# Patient Record
Sex: FEMALE | Race: WHITE | Employment: OTHER | URBAN - METROPOLITAN AREA
[De-identification: names, ages, dates, MRNs, and addresses within clinical notes are randomized per-mention and may not be internally consistent; named-entity substitution may affect disease eponyms.]

---

## 2022-10-15 ENCOUNTER — HOSPITAL ENCOUNTER (EMERGENCY)
Dept: ULTRASOUND IMAGING | Age: 37
Discharge: HOME | End: 2022-10-18

## 2022-10-15 ENCOUNTER — HOSPITAL ENCOUNTER (EMERGENCY)
Age: 37
Discharge: HOME | End: 2022-10-15
Attending: EMERGENCY MEDICINE | Admitting: EMERGENCY MEDICINE

## 2022-10-15 VITALS
WEIGHT: 166 LBS | DIASTOLIC BLOOD PRESSURE: 73 MMHG | BODY MASS INDEX: 27.66 KG/M2 | RESPIRATION RATE: 16 BRPM | SYSTOLIC BLOOD PRESSURE: 103 MMHG | HEIGHT: 65 IN | TEMPERATURE: 98.5 F | HEART RATE: 91 BPM | OXYGEN SATURATION: 93 %

## 2022-10-15 DIAGNOSIS — I83.819 PAIN DUE TO VARICOSE VEINS OF LOWER EXTREMITY: ICD-10-CM

## 2022-10-15 DIAGNOSIS — M79.604 RIGHT LEG PAIN: Primary | ICD-10-CM

## 2022-10-15 PROCEDURE — 99284 EMERGENCY DEPT VISIT MOD MDM: CPT | Performed by: EMERGENCY MEDICINE

## 2022-10-15 PROCEDURE — 93971 EXTREMITY STUDY: CPT

## 2022-10-15 RX ORDER — ASPIRIN 81 MG/1
81 TABLET ORAL DAILY
COMMUNITY

## 2022-10-15 ASSESSMENT — PAIN - FUNCTIONAL ASSESSMENT
PAIN_FUNCTIONAL_ASSESSMENT: 0-10
PAIN_FUNCTIONAL_ASSESSMENT: 0-10

## 2022-10-15 ASSESSMENT — PAIN SCALES - GENERAL
PAINLEVEL_OUTOF10: 3
PAINLEVEL_OUTOF10: 3

## 2022-10-15 ASSESSMENT — ENCOUNTER SYMPTOMS
COLOR CHANGE: 0
ABDOMINAL PAIN: 0
DIARRHEA: 0
VOMITING: 0
BACK PAIN: 0
SHORTNESS OF BREATH: 0
COUGH: 0
NAUSEA: 0

## 2022-10-15 ASSESSMENT — PAIN DESCRIPTION - ONSET: ONSET: ON-GOING

## 2022-10-15 ASSESSMENT — PAIN DESCRIPTION - FREQUENCY: FREQUENCY: CONTINUOUS

## 2022-10-15 ASSESSMENT — PAIN DESCRIPTION - ORIENTATION
ORIENTATION: RIGHT
ORIENTATION: RIGHT

## 2022-10-15 ASSESSMENT — PAIN DESCRIPTION - LOCATION
LOCATION: LEG
LOCATION: LEG

## 2022-10-15 ASSESSMENT — PAIN DESCRIPTION - PAIN TYPE: TYPE: CHRONIC PAIN

## 2022-10-15 ASSESSMENT — PAIN DESCRIPTION - DESCRIPTORS: DESCRIPTORS: DULL

## 2022-10-15 NOTE — ED PROVIDER NOTES
Emergency Department Provider Note                   PCP:                None Provider               Age: 39 y.o. Sex: female       ICD-10-CM    1. Right leg pain  M79.604       2. Pain due to varicose veins of lower extremity  I83.819           DISPOSITION Decision To Discharge 10/15/2022 08:27:07 PM        MDM  Number of Diagnoses or Management Options  Pain due to varicose veins of lower extremity: new, needed workup  Right leg pain  Diagnosis management comments: Vital signs stable. Initial O2 sat inaccurate. Right lower extremity duplex ultrasound negative for DVT. No evidence of cellulitis or erythema to right lower extremity. Doppler performed at bedside with fetal heart tones in the 140s. Patient denies abdominal pain, vaginal bleeding, vaginal discharge, pelvic pain, leakage of fluids. Reports good fetal movement. Instructed on need for close follow-up vascular surgery, midwife/OB. Patient given return precautions. Patient instructed to continue wearing compression stockings. Amount and/or Complexity of Data Reviewed  Tests in the radiology section of CPT®: ordered and reviewed  Tests in the medicine section of CPT®: ordered and reviewed  Review and summarize past medical records: yes    Risk of Complications, Morbidity, and/or Mortality  Presenting problems: moderate  Diagnostic procedures: low  Management options: low    Patient Progress  Patient progress: stable       ED Course as of 10/15/22 2054   Sat Oct 15, 2022   2026 Duplex US RLE    FINDINGS:     The common femoral, femoral, popliteal, posterior tibial and peroneal veins are  patent and compressible. There is color Doppler flow. No evidence of DVT. Dilated superficial veins are noted. IMPRESSION:  1. No evidence of deep vein thrombosis.  [DF]      ED Course User Index  [DF] Derek Sewell., MD        Orders Placed This Encounter   Procedures    POCT Urine Dipstick    Vascular duplex lower extremity venous right        Medications - No data to display    New Prescriptions    No medications on file        Vanita Mitchell is a 39 y.o. female who presents to the Emergency Department with chief complaint of  R calf pain. Chief Complaint   Patient presents with    Leg Pain     Patient presents complaining of right upper leg pain. History of varicose veins. Reports swelling of vein, increased area, and pain that started this morning. Patient wears compression leggings for prevention. 30 weeks pregnant. No issues with pregnancy today. Concerns for blood clot. 78-year-old female who is currently 30 weeks pregnant presents with complaint of increased pain to right upper thigh region and right calf region that is worsened over the past 24 hours. Patient does report history of varicose veins to bilateral lower extremities. Patient states that she underwent procedure with Dr. Franklin Marie at [facility] in February for endovascular ablation for varicose veins. States that she typically does not have pain localized to her varicose veins to right lower extremity. Denies any recent trauma or injury. Denies chest pain, shortness of breath, cough, hemoptysis. Denies abdominal pain, nausea, vomiting, vaginal bleeding, vaginal discharge, dysuria, hematuria, flank pain. Patient denies any alleviating or exacerbating factors. States she is not taking anything for pain. The history is provided by the patient. No  was used. Review of Systems   Constitutional:  Negative for diaphoresis, fatigue and fever. HENT:  Negative for congestion. Respiratory:  Negative for cough and shortness of breath. Cardiovascular:  Positive for leg swelling. Negative for chest pain. Gastrointestinal:  Negative for abdominal pain, diarrhea, nausea and vomiting. Genitourinary:  Negative for dysuria, flank pain, hematuria, pelvic pain, vaginal bleeding, vaginal discharge and vaginal pain.    Musculoskeletal: Negative for arthralgias, back pain and myalgias. Skin:  Negative for color change, rash and wound. Neurological:  Negative for syncope, weakness, light-headedness and headaches. Hematological:  Does not bruise/bleed easily. Past Medical history: Varicose veins    Past surgical history: Status post endovascular ablation for treatment of right lower extremity varicose veins in February 2022    Family history: Reviewed. Social History     Socioeconomic History    Marital status:          Patient has no known allergies. Previous Medications    ASPIRIN 81 MG EC TABLET    Take 81 mg by mouth daily    PRENATAL VIT W/PV-FJODELREH-JE (PNV PO)    Take by mouth        Vitals signs and nursing note reviewed. Patient Vitals for the past 4 hrs:   Temp Pulse Resp BP SpO2   10/15/22 1708 98.5 °F (36.9 °C) 92 18 123/87 93 %          Physical Exam  Vitals and nursing note reviewed. Constitutional:       Appearance: Normal appearance. HENT:      Head: Normocephalic. Nose: Nose normal.      Mouth/Throat:      Mouth: Mucous membranes are moist.   Eyes:      Extraocular Movements: Extraocular movements intact. Pupils: Pupils are equal, round, and reactive to light. Cardiovascular:      Rate and Rhythm: Normal rate. Pulses: Normal pulses. Heart sounds: Normal heart sounds. Pulmonary:      Effort: Pulmonary effort is normal.      Breath sounds: Normal breath sounds. Comments: CTAB. Abdominal:      Palpations: Abdomen is soft. Tenderness: There is no abdominal tenderness. There is no guarding or rebound. Comments: Gravid abdomen. Musculoskeletal:         General: Tenderness present. No deformity. Normal range of motion. Cervical back: Normal range of motion. No rigidity. Right lower leg: No edema. Left lower leg: No edema. Comments: Varicose veins noted to bilateral lower extremities. No palpable cords. Mild R calf TTP.  DP/PT pulses 2+ and equal throughout. NVID. Normal sensory exam to right lower extremity. No evidence of cellulitis or erythema present. FROM RLE. Skin:     General: Skin is warm. Findings: No bruising, erythema or rash. Neurological:      General: No focal deficit present. Mental Status: She is alert and oriented to person, place, and time. Cranial Nerves: No cranial nerve deficit. Sensory: No sensory deficit. Motor: No weakness. Psychiatric:         Mood and Affect: Mood normal.         Behavior: Behavior normal.        Procedures    Results for orders placed or performed during the hospital encounter of 10/15/22   Vascular duplex lower extremity venous right    Narrative    Clinical history: Pain and swelling    TECHNIQUE: Grayscale and color Doppler venous ultrasound of the right lower  extremity. FINDINGS:    The common femoral, femoral, popliteal, posterior tibial and peroneal veins are  patent and compressible. There is color Doppler flow. No evidence of DVT. Dilated superficial veins are noted. Impression    1. No evidence of deep vein thrombosis. Vascular duplex lower extremity venous right   Final Result      1. No evidence of deep vein thrombosis. Voice dictation software was used during the making of this note. This software is not perfect and grammatical and other typographical errors may be present. This note has not been completely proofread for errors.      Dreek Hooks MD  10/15/22 [id number]

## 2022-10-15 NOTE — ED TRIAGE NOTES
Patient presents complaining of right upper leg pain. History of varicose veins. Reports swelling of vein, increased area, and pain that started this morning. Patient wears compression leggings for prevention. 30 weeks pregnant. No issues with pregnancy today. Concerns for blood clot.

## 2022-10-16 NOTE — ED NOTES
I have reviewed discharge instructions with the patient. The patient verbalized understanding. Patient left ED via Discharge Method: ambulatory to Home with significant other    Opportunity for questions and clarification provided. Patient given 0 scripts. To continue your aftercare when you leave the hospital, you may receive an automated call from our care team to check in on how you are doing. This is a free service and part of our promise to provide the best care and service to meet your aftercare needs.  If you have questions, or wish to unsubscribe from this service please call [phone]. Thank you for Choosing our [facility] Emergency Department.       Katie Kebede RN  10/15/22 2100

## 2022-10-16 NOTE — ED NOTES
I have reviewed discharge instructions with the patient. The patient verbalized understanding. Patient left ED via Discharge Method: ambulatory to Home with family. Opportunity for questions and clarification provided. Patient given 0 scripts.             Zak Hutchinson RN  10/15/22 [id number]

## 2023-07-19 ENCOUNTER — APPOINTMENT (RX ONLY)
Dept: URBAN - METROPOLITAN AREA CLINIC 25 | Facility: CLINIC | Age: 38
Setting detail: DERMATOLOGY
End: 2023-07-19

## 2023-07-19 DIAGNOSIS — D22 MELANOCYTIC NEVI: ICD-10-CM

## 2023-07-19 DIAGNOSIS — Z71.89 OTHER SPECIFIED COUNSELING: ICD-10-CM

## 2023-07-19 PROBLEM — D22.5 MELANOCYTIC NEVI OF TRUNK: Status: ACTIVE | Noted: 2023-07-19

## 2023-07-19 PROCEDURE — ? BODY PHOTOGRAPHY

## 2023-07-19 PROCEDURE — 99202 OFFICE O/P NEW SF 15 MIN: CPT

## 2023-07-19 PROCEDURE — ? COUNSELING

## 2023-07-19 ASSESSMENT — LOCATION SIMPLE DESCRIPTION DERM: LOCATION SIMPLE: ABDOMEN

## 2023-07-19 ASSESSMENT — LOCATION DETAILED DESCRIPTION DERM: LOCATION DETAILED: RIGHT RIB CAGE

## 2023-07-19 ASSESSMENT — LOCATION ZONE DERM: LOCATION ZONE: TRUNK

## 2023-07-19 NOTE — HPI: SKIN LESION
What Type Of Note Output Would You Prefer (Optional)?: Standard Output
How Severe Is Your Skin Lesion?: mild
Has Your Skin Lesion Been Treated?: not been treated
Is This A New Presentation, Or A Follow-Up?: Skin Lesion
Additional History: Patient reports that the lesion began changing the last year.\\nPt is breastfeeding.

## 2023-07-19 NOTE — PROCEDURE: BODY PHOTOGRAPHY
Was The Entire Body Photographed (Cannot Bill Unless Entire Body Photographed)?: Please Select Yes or No - If you select Yes you are confirming that you took full body photographs
Detail Level: Detailed
Consent was obtained for whole body photography. Patient understands that photograph costs may not be covered by insurance.
Number Of Photographs (Optional- Will Not Render If 0): 1
Whole Body Statement: The whole body was photographed today.